# Patient Record
Sex: MALE | Race: WHITE | NOT HISPANIC OR LATINO | Employment: UNEMPLOYED | URBAN - METROPOLITAN AREA
[De-identification: names, ages, dates, MRNs, and addresses within clinical notes are randomized per-mention and may not be internally consistent; named-entity substitution may affect disease eponyms.]

---

## 2021-02-03 ENCOUNTER — OFFICE VISIT (OUTPATIENT)
Dept: FAMILY MEDICINE CLINIC | Facility: CLINIC | Age: 31
End: 2021-02-03

## 2021-02-03 VITALS
SYSTOLIC BLOOD PRESSURE: 120 MMHG | OXYGEN SATURATION: 95 % | HEIGHT: 72 IN | DIASTOLIC BLOOD PRESSURE: 82 MMHG | WEIGHT: 247.4 LBS | BODY MASS INDEX: 33.51 KG/M2 | HEART RATE: 107 BPM

## 2021-02-03 DIAGNOSIS — F41.9 ANXIETY: Primary | ICD-10-CM

## 2021-02-03 DIAGNOSIS — J30.2 SEASONAL ALLERGIC RHINITIS, UNSPECIFIED TRIGGER: ICD-10-CM

## 2021-02-03 DIAGNOSIS — J35.1 ENLARGED TONSILS: ICD-10-CM

## 2021-02-03 PROCEDURE — 99204 OFFICE O/P NEW MOD 45 MIN: CPT | Performed by: INTERNAL MEDICINE

## 2021-02-03 RX ORDER — FLUTICASONE PROPIONATE 50 MCG
2 SPRAY, SUSPENSION (ML) NASAL NIGHTLY
Qty: 17 ML | Refills: 6 | Status: SHIPPED | OUTPATIENT
Start: 2021-02-03 | End: 2022-01-04

## 2021-02-03 RX ORDER — CETIRIZINE HYDROCHLORIDE 10 MG/1
10 TABLET ORAL DAILY
Qty: 30 TABLET | Refills: 11 | Status: SHIPPED | OUTPATIENT
Start: 2021-02-03 | End: 2022-02-14

## 2021-02-03 RX ORDER — ESCITALOPRAM OXALATE 10 MG/1
10 TABLET ORAL DAILY
Qty: 30 TABLET | Refills: 5 | Status: SHIPPED | OUTPATIENT
Start: 2021-02-03 | End: 2021-08-11

## 2021-02-03 NOTE — PROGRESS NOTES
Tucker Coelho  1990  [id number]  Patient Care Team:  Robe Peña MD as PCP - General (Internal Medicine)    Tucker Coelho is a 30 y.o. male here today for follow up.     This patient is accompanied by their self who contributes to the history of their care.    Chief Complaint:    Chief Complaint   Patient presents with   • Establish Care   • Allergies     was on steriod, mucinex and it helped him alot. Wants a referral to the allergy specialist          History of Present Illness:  I have reviewed and/or updated the patient's past medical, past surgical, family, social history, problem list and allergies as appropriate.   Gentleman is here to establish complaining of nasal congestion and thick mucus.  He is in trouble for years.  He has been intermittently treated with allergy medicine.  He was seen by urgent treatment center and placed on cetirizine and Flonase as well as steroid taper.  This seemed to help however the further he got away from the steroids his symptoms return.  He has difficulty sleeping at night from this.  He does have large tonsils he occasionally snores but there is been no witnessed apnea.  He is able to be a passenger in a car without falling asleep he is able to read without falling asleep and is able to watch movies to completion.  He has not had formal allergy testing.  He denies any earaches or sore throat.  No eye itching or redness.  Is typically nasal obstruction itching and thick mucus.  No history of asthma.  Denies any rashes.    Additionally has been working with a therapist as he is feeling overwhelmed.  He occasionally will have racing thoughts which precipitate a subjective heavy feeling in his chest.  He sleeps for the most part okay.  No homicidal suicidal thoughts.  He feels anxious at least several times per week.  He is an  who is currently unemployed.  He is a 2-year-old daughter and a fiancée.  He has never been on medication for depression or  "anxiety.  He looks forward to doing things.  Ends to worry a significant amount.      Review of Systems:    Review of Systems   Constitutional: Negative.    HENT: Positive for congestion, postnasal drip, sinus pressure and sneezing. Negative for dental problem, hearing loss, swollen glands, tinnitus, trouble swallowing and voice change.         Nasal obstruction at night   Eyes: Negative.    Respiratory: Negative.    Gastrointestinal: Negative.    Endocrine: Negative.    Genitourinary: Negative.    Skin: Negative.    Neurological: Negative.    Psychiatric/Behavioral: Positive for stress. Negative for decreased concentration, self-injury, sleep disturbance, suicidal ideas and depressed mood. The patient is nervous/anxious.        Vitals:    02/03/21 1258   BP: 120/82   Pulse: 107   SpO2: 95%   Weight: 112 kg (247 lb 6.4 oz)   Height: 182.9 cm (72\")     Body mass index is 33.55 kg/m².      Current Outpatient Medications:   •  cetirizine (zyrTEC) 10 MG tablet, Take 1 tablet by mouth Daily., Disp: 30 tablet, Rfl: 11  •  fluticasone (FLONASE) 50 MCG/ACT nasal spray, 2 sprays into the nostril(s) as directed by provider Every Night for 30 days. Administer 2 sprays in each nostril for each dose., Disp: 17 mL, Rfl: 6  •  escitalopram (Lexapro) 10 MG tablet, Take 1 tablet by mouth Daily., Disp: 30 tablet, Rfl: 5    Physical Exam:    Physical Exam  Vitals signs and nursing note reviewed.   Constitutional:       General: He is not in acute distress.     Appearance: He is well-developed. He is not diaphoretic.   HENT:      Head: Normocephalic and atraumatic.      Right Ear: External ear normal.      Left Ear: External ear normal.      Mouth/Throat:      Mouth: Mucous membranes are moist.      Pharynx: Oropharynx is clear. No oropharyngeal exudate.      Comments: Nearly kissing tonsils  Eyes:      General: No scleral icterus.        Right eye: No discharge.      Conjunctiva/sclera: Conjunctivae normal.   Neck:      " Musculoskeletal: Normal range of motion and neck supple.      Thyroid: No thyromegaly.      Vascular: No JVD.      Trachea: No tracheal deviation.   Cardiovascular:      Rate and Rhythm: Normal rate and regular rhythm.      Heart sounds: Normal heart sounds.      Comments: PMI nondisplaced  Pulmonary:      Effort: Pulmonary effort is normal.      Breath sounds: Normal breath sounds. No wheezing or rales.   Abdominal:      General: Bowel sounds are normal.      Palpations: Abdomen is soft.      Tenderness: There is no abdominal tenderness. There is no guarding or rebound.   Musculoskeletal:      Comments: Normal gait   Lymphadenopathy:      Cervical: No cervical adenopathy.   Skin:     General: Skin is warm and dry.      Capillary Refill: Capillary refill takes less than 2 seconds.      Coloration: Skin is not pale.      Findings: No rash.   Neurological:      Mental Status: He is alert and oriented to person, place, and time.      Motor: No abnormal muscle tone.      Coordination: Coordination normal.   Psychiatric:         Judgment: Judgment normal.         Procedures    Results Review:    None    Assessment/Plan:  This is a 30-year-old gentleman who presents with the symptoms that are likely seasonal allergic rhinitis.  Cannot rule out that he has a nasal obstruction.  He also has significant Mik enlarged tonsils and is at risk for sleep apnea however does not appear to be symptomatic with this.  I have referred him to ear nose and throat for laryngoscopy as well as allergy testing.    Regarding his anxiety I encouraged him to continue therapy however recommended Lexapro 10 mg daily.  I would like for him to follow-up here in 4 weeks to see if his symptoms will likely continue otherwise we will taper him off of this.  He seems amenable to this plan.  Problem List Items Addressed This Visit        Allergies and Adverse Reactions    Seasonal allergic rhinitis    Relevant Medications    cetirizine (zyrTEC) 10 MG  tablet    fluticasone (FLONASE) 50 MCG/ACT nasal spray    Other Relevant Orders    Ambulatory Referral to ENT (Otolaryngology)       ENT    Enlarged tonsils       Mental Health    Anxiety - Primary    Current Assessment & Plan     Regarding his anxiety I encouraged him to continue therapy however recommended Lexapro 10 mg daily.  I would like for him to follow-up here in 4 weeks to see if his symptoms will likely continue otherwise we will taper him off of this.  He seems amenable to this plan.               Plan of care reviewed with patient at the conclusion of today's visit. Education was provided regarding diagnosis and management.  Patient verbalizes understanding of and agreement with management plan.    Return in about 4 weeks (around 3/3/2021) for Annual.    Robe Peña MD    Please note that portions of this note may have been completed with a voice recognition program. Efforts were made to edit the dictations, but occasionally words are mistranscribed.

## 2021-02-03 NOTE — ASSESSMENT & PLAN NOTE
Regarding his anxiety I encouraged him to continue therapy however recommended Lexapro 10 mg daily.  I would like for him to follow-up here in 4 weeks to see if his symptoms will likely continue otherwise we will taper him off of this.  He seems amenable to this plan.

## 2021-03-03 ENCOUNTER — OFFICE VISIT (OUTPATIENT)
Dept: FAMILY MEDICINE CLINIC | Facility: CLINIC | Age: 31
End: 2021-03-03

## 2021-03-03 VITALS
WEIGHT: 246 LBS | HEIGHT: 72 IN | SYSTOLIC BLOOD PRESSURE: 110 MMHG | OXYGEN SATURATION: 98 % | HEART RATE: 73 BPM | DIASTOLIC BLOOD PRESSURE: 80 MMHG | BODY MASS INDEX: 33.32 KG/M2

## 2021-03-03 DIAGNOSIS — K21.9 GASTROESOPHAGEAL REFLUX DISEASE, UNSPECIFIED WHETHER ESOPHAGITIS PRESENT: ICD-10-CM

## 2021-03-03 DIAGNOSIS — Z11.59 ENCOUNTER FOR HEPATITIS C SCREENING TEST FOR LOW RISK PATIENT: ICD-10-CM

## 2021-03-03 DIAGNOSIS — Z13.220 SCREENING CHOLESTEROL LEVEL: ICD-10-CM

## 2021-03-03 DIAGNOSIS — F41.9 ANXIETY: ICD-10-CM

## 2021-03-03 DIAGNOSIS — Z13.29 THYROID DISORDER SCREENING: ICD-10-CM

## 2021-03-03 DIAGNOSIS — Z00.00 ANNUAL PHYSICAL EXAM: Primary | ICD-10-CM

## 2021-03-03 DIAGNOSIS — F40.231 SEVERE NEEDLE PHOBIA: ICD-10-CM

## 2021-03-03 PROCEDURE — 99395 PREV VISIT EST AGE 18-39: CPT | Performed by: INTERNAL MEDICINE

## 2021-03-03 RX ORDER — OMEPRAZOLE 20 MG/1
20 CAPSULE, DELAYED RELEASE ORAL DAILY
Qty: 30 CAPSULE | Refills: 6 | Status: SHIPPED | OUTPATIENT
Start: 2021-03-03 | End: 2021-10-13

## 2021-03-03 NOTE — ASSESSMENT & PLAN NOTE
Given the ear nose and throat comments, has persistent congestion and a duration of reflux, I have recommended an EGD.  He is concerned given his needle phobia.  I did indicate that he is did undergo endoscopy we could provide him with a one-time dose of a benzodiazepine in efforts to well his anxiety awaiting his procedure.  He will consider this.  However we have placed him on omeprazole 20 mg daily.

## 2021-03-03 NOTE — ASSESSMENT & PLAN NOTE
I have ordered CMP CBC TSH hepatitis C antibody.  He however will likely not show up for his lab draw secondary to severe needle phobia.

## 2021-03-03 NOTE — ASSESSMENT & PLAN NOTE
General improved with Lexapro 10 mg daily.  He does have a little bit of daytime fatigue of asked him to take it at night.

## 2021-03-03 NOTE — PROGRESS NOTES
Tucker Coelho  1990  [id number]  Patient Care Team:  Robe Peña MD as PCP - General (Internal Medicine)    Tucker Coelho is a 30 y.o. male who is here today for his annual physical   This patient is accompanied by his self who contributes to the history of his care.    Chief Complaint:    Chief Complaint   Patient presents with   • Annual Exam          History of Present Illness:    Gerd, used to take ranitidine and OTC prilosec. + brashwater, awakes with lots of drainage in throat. ENT feels PRAKASH may be more severe. Denies dysphagia or odynophagia. Denies n/v . Has been on OTC medications  For years. No prior endoscopy.    Past Medical History:   Diagnosis Date   • Allergic        History reviewed. No pertinent surgical history.     Family History   Problem Relation Age of Onset   • Diabetes Maternal Grandmother    • Hypertension Maternal Grandfather    • Anxiety disorder Mother    • No Known Problems Father        Social History     Socioeconomic History   • Marital status: Single     Spouse name: Not on file   • Number of children: Not on file   • Years of education: Not on file   • Highest education level: Not on file   Tobacco Use   • Smoking status: Never Smoker   • Smokeless tobacco: Never Used   Substance and Sexual Activity   • Alcohol use: Yes     Alcohol/week: 1.0 standard drinks     Types: 1 Cans of beer per week     Comment: rarely   • Drug use: Never   • Sexual activity: Yes     Birth control/protection: OCP   Social History Narrative    In a relationship.        No Known Allergies    Depression: PHQ-2 Depression Screening  Little interest or pleasure in doing things?  0   Feeling down, depressed, or hopeless?  0   PHQ-2 Total Score  0        There is no immunization history on file for this patient.    Review of Systems:    Review of Systems   Constitutional: Negative.    HENT: Positive for postnasal drip.         Snoring, nasal congestion   Eyes: Negative.    Respiratory:  "Negative.    Cardiovascular: Negative.    Gastrointestinal: Negative.    Endocrine: Negative.    Genitourinary: Negative.    Musculoskeletal: Negative.    Skin: Negative.    Allergic/Immunologic: Negative for environmental allergies and food allergies.   Neurological: Negative.    Hematological: Negative.    Psychiatric/Behavioral: Negative.        Vitals:    03/03/21 0946   BP: 110/80   Pulse: 73   SpO2: 98%   Weight: 112 kg (246 lb)   Height: 182.9 cm (72\")     Body mass index is 33.36 kg/m².      Current Outpatient Medications:   •  cetirizine (zyrTEC) 10 MG tablet, Take 1 tablet by mouth Daily., Disp: 30 tablet, Rfl: 11  •  escitalopram (Lexapro) 10 MG tablet, Take 1 tablet by mouth Daily., Disp: 30 tablet, Rfl: 5  •  fluticasone (FLONASE) 50 MCG/ACT nasal spray, 2 sprays into the nostril(s) as directed by provider Every Night for 30 days. Administer 2 sprays in each nostril for each dose., Disp: 17 mL, Rfl: 6  •  omeprazole (PrilOSEC) 20 MG capsule, Take 1 capsule by mouth Daily., Disp: 30 capsule, Rfl: 6    Physical Exam:    Physical Exam  Vitals signs and nursing note reviewed.   Constitutional:       General: He is not in acute distress.     Appearance: Normal appearance. He is well-developed. He is not diaphoretic.   HENT:      Head: Normocephalic and atraumatic.      Right Ear: External ear normal.      Left Ear: External ear normal.      Nose: Congestion present.      Mouth/Throat:      Mouth: Mucous membranes are moist.      Pharynx: No oropharyngeal exudate.      Comments: Tonsillar hypertrophy  Eyes:      General: No scleral icterus.        Right eye: No discharge.         Left eye: No discharge.      Extraocular Movements: Extraocular movements intact.      Conjunctiva/sclera: Conjunctivae normal.      Pupils: Pupils are equal, round, and reactive to light.   Neck:      Musculoskeletal: Normal range of motion and neck supple.      Thyroid: No thyromegaly.      Vascular: No JVD.      Trachea: No " tracheal deviation.   Cardiovascular:      Rate and Rhythm: Normal rate and regular rhythm.      Pulses: Normal pulses.      Heart sounds: Normal heart sounds. No murmur. No gallop.       Comments: PMI nondisplaced  Pulmonary:      Effort: Pulmonary effort is normal.      Breath sounds: Normal breath sounds. No wheezing or rales.   Abdominal:      General: Bowel sounds are normal. There is no distension.      Palpations: Abdomen is soft. There is no mass.      Tenderness: There is no abdominal tenderness. There is no guarding or rebound.      Hernia: No hernia is present.   Musculoskeletal: Normal range of motion.      Comments: Normal gait   Lymphadenopathy:      Cervical: No cervical adenopathy.   Skin:     General: Skin is warm and dry.      Capillary Refill: Capillary refill takes less than 2 seconds.      Coloration: Skin is not jaundiced or pale.      Findings: No bruising or rash.   Neurological:      Mental Status: He is alert and oriented to person, place, and time.      Motor: No abnormal muscle tone.      Coordination: Coordination normal.   Psychiatric:         Mood and Affect: Mood normal.         Behavior: Behavior normal.         Thought Content: Thought content normal.         Judgment: Judgment normal.         Procedures    Results Review:    None    Assessment/Plan:     Problem List Items Addressed This Visit        Gastrointestinal Abdominal     GERD (gastroesophageal reflux disease)    Current Assessment & Plan     Given the ear nose and throat comments, has persistent congestion and a duration of reflux, I have recommended an EGD.  He is concerned given his needle phobia.  I did indicate that he is did undergo endoscopy we could provide him with a one-time dose of a benzodiazepine in efforts to well his anxiety awaiting his procedure.  He will consider this.  However we have placed him on omeprazole 20 mg daily.         Relevant Medications    omeprazole (PrilOSEC) 20 MG capsule       Mental  Health    Anxiety    Current Assessment & Plan     General improved with Lexapro 10 mg daily.  He does have a little bit of daytime fatigue of asked him to take it at night.         Severe needle phobia    Current Assessment & Plan     I have ordered CMP CBC TSH hepatitis C antibody.  He however will likely not show up for his lab draw secondary to severe needle phobia.           Other Visit Diagnoses     Annual physical exam    -  Primary    Relevant Orders    CBC & Differential    Comprehensive Metabolic Panel    Encounter for hepatitis C screening test for low risk patient        Relevant Orders    Hepatitis C antibody    Thyroid disorder screening        Relevant Orders    TSH Rfx On Abnormal To Free T4    Screening cholesterol level        Relevant Orders    Lipid Panel          Plan of care was reviewed with patient at the conclusion of today's visit. Counseled patient with regards to good nutrition and diet. Maintaining a healthy lifestyle including exercise and physical activities. Spoke with patient on ways to reduce stress, getting adequate sleep and injury prevention.  Discussed prostate cancer screening, colon cancer screening including benefit of early detection and potential need for follow-up. Patient agrees to screenings today. Annual dental and eye exams were encouraged. Encouraged patient to continue to follow up with annual immunizations.     Return in about 6 months (around 9/3/2021), or omeprazole/lexapro.    Robe Peña MD    Please note that portions of this note may have been completed with a voice recognition program. Efforts were made to edit the dictations, but occasionally words are mistranscribed.

## 2021-08-11 RX ORDER — ESCITALOPRAM OXALATE 10 MG/1
TABLET ORAL
Qty: 30 TABLET | Refills: 5 | Status: SHIPPED | OUTPATIENT
Start: 2021-08-11 | End: 2022-02-15

## 2021-08-11 NOTE — TELEPHONE ENCOUNTER
Rx Refill Note  Requested Prescriptions     Pending Prescriptions Disp Refills   • escitalopram (LEXAPRO) 10 MG tablet [Pharmacy Med Name: ESCITALOPRAM 10 MG TABLET] 30 tablet 5     Sig: TAKE 1 TABLET BY MOUTH EVERY DAY      Last office visit with prescribing clinician: 3/3/2021      Next office visit with prescribing clinician: 8/12/2021   }  Cristina Nunez MA  08/11/21, 13:07 EDT     Last fill: 02/03/2021

## 2021-08-12 ENCOUNTER — OFFICE VISIT (OUTPATIENT)
Dept: FAMILY MEDICINE CLINIC | Facility: CLINIC | Age: 31
End: 2021-08-12

## 2021-08-12 VITALS
DIASTOLIC BLOOD PRESSURE: 62 MMHG | HEIGHT: 72 IN | WEIGHT: 244 LBS | HEART RATE: 72 BPM | SYSTOLIC BLOOD PRESSURE: 112 MMHG | OXYGEN SATURATION: 98 % | BODY MASS INDEX: 33.05 KG/M2

## 2021-08-12 DIAGNOSIS — F41.9 ANXIETY: ICD-10-CM

## 2021-08-12 DIAGNOSIS — J30.2 SEASONAL ALLERGIC RHINITIS, UNSPECIFIED TRIGGER: ICD-10-CM

## 2021-08-12 DIAGNOSIS — K21.9 GASTROESOPHAGEAL REFLUX DISEASE, UNSPECIFIED WHETHER ESOPHAGITIS PRESENT: Primary | ICD-10-CM

## 2021-08-12 PROCEDURE — 99213 OFFICE O/P EST LOW 20 MIN: CPT | Performed by: INTERNAL MEDICINE

## 2021-08-12 NOTE — ASSESSMENT & PLAN NOTE
Well-controlled with Lexapro.  He is experiencing no side effects.  We will reevaluate this in 6 months.  Refills were sent in yesterday.

## 2021-08-12 NOTE — PROGRESS NOTES
"Tucker Coelho  1990  [id number]  Patient Care Team:  Robe Peña MD as PCP - General (Internal Medicine)    Tucker Coelho is a 31 y.o. male here today for follow up.     This patient is accompanied by their self who contributes to the history of their care.    Chief Complaint:    Chief Complaint   Patient presents with   • Anxiety     Follow up Doing a lot better.         History of Present Illness:  I have reviewed and/or updated the patient's past medical, past surgical, family, social history, problem list and allergies as appropriate.      Doing well with regards to anxiety and reflux. For the most part sleeping is ok. Dreams vivid with talking in his sleep.. Less agitation. Allergies remain in check as long as he stays on zyrtec/flonase. Denies dysphagia, n/v. Reflux sx revolved with omeprazole      Review of Systems   Constitutional: Negative.    HENT: Negative.    Respiratory: Negative.    Cardiovascular: Negative.    Gastrointestinal: Negative for GERD.   Psychiatric/Behavioral: Negative.        Vitals:    08/12/21 0814   BP: 112/62   Pulse: 72   SpO2: 98%   Weight: 111 kg (244 lb)   Height: 182.9 cm (72.01\")   PainSc: 0-No pain     Body mass index is 33.08 kg/m².    Physical Exam  Vitals and nursing note reviewed.   Constitutional:       General: He is not in acute distress.     Appearance: He is well-developed. He is not diaphoretic.   HENT:      Head: Normocephalic and atraumatic.      Right Ear: External ear normal.      Left Ear: External ear normal.      Mouth/Throat:      Mouth: Mucous membranes are moist.      Pharynx: Oropharynx is clear. No oropharyngeal exudate.   Eyes:      General: No scleral icterus.        Right eye: No discharge.      Conjunctiva/sclera: Conjunctivae normal.   Neck:      Thyroid: No thyromegaly.      Vascular: No JVD.      Trachea: No tracheal deviation.   Cardiovascular:      Rate and Rhythm: Normal rate and regular rhythm.      Heart sounds: Normal heart " sounds.      Comments: PMI nondisplaced  Pulmonary:      Effort: Pulmonary effort is normal. No respiratory distress.      Breath sounds: Normal breath sounds. No wheezing or rales.   Abdominal:      General: Bowel sounds are normal.      Palpations: Abdomen is soft.      Tenderness: There is no abdominal tenderness. There is no guarding or rebound.   Musculoskeletal:      Cervical back: Normal range of motion and neck supple.      Comments: Normal gait   Lymphadenopathy:      Cervical: No cervical adenopathy.   Skin:     General: Skin is warm and dry.      Capillary Refill: Capillary refill takes less than 2 seconds.      Coloration: Skin is not pale.      Findings: No rash.   Neurological:      Mental Status: He is alert and oriented to person, place, and time.      Motor: No abnormal muscle tone.      Coordination: Coordination normal.   Psychiatric:         Judgment: Judgment normal.         Procedures    Results Review:    I reviewed the patient's new clinical results.    Assessment/Plan:     Problem List Items Addressed This Visit        Allergies and Adverse Reactions    Seasonal allergic rhinitis    Current Assessment & Plan     controlled with Zyrtec and Flonase.         Relevant Medications    cetirizine (zyrTEC) 10 MG tablet       Gastrointestinal Abdominal     GERD (gastroesophageal reflux disease) - Primary    Current Assessment & Plan     Stable on Prilosec.  Reminded not to eat 2 hours before supine.  Elevate head of bed.         Relevant Medications    omeprazole (PrilOSEC) 20 MG capsule       Mental Health    Anxiety    Current Assessment & Plan     Well-controlled with Lexapro.  He is experiencing no side effects.  We will reevaluate this in 6 months.  Refills were sent in yesterday.               Plan of care reviewed with patient at the conclusion of today's visit. Education was provided regarding diagnosis and management.  Patient verbalizes understanding of and agreement with management  plan.    Return in about 6 months (around 2/12/2022) for Annual.    Robe Peña MD    Please note that portions of this note may have been completed with a voice recognition program. Efforts were made to edit the dictations, but occasionally words are mistranscribed.

## 2021-10-13 RX ORDER — OMEPRAZOLE 20 MG/1
CAPSULE, DELAYED RELEASE ORAL
Qty: 30 CAPSULE | Refills: 4 | Status: SHIPPED | OUTPATIENT
Start: 2021-10-13 | End: 2022-03-14

## 2021-10-13 NOTE — TELEPHONE ENCOUNTER
Rx Refill Note  Requested Prescriptions     Pending Prescriptions Disp Refills   • omeprazole (priLOSEC) 20 MG capsule [Pharmacy Med Name: OMEPRAZOLE DR 20 MG CAPSULE] 30 capsule 4     Sig: TAKE 1 CAPSULE BY MOUTH EVERY DAY      Last office visit with prescribing clinician: 8/12/2021      Next office visit with prescribing clinician: 3/4/2022            Ana Lilia Lino MA  10/13/21, 08:33 EDT

## 2021-10-22 ENCOUNTER — OFFICE VISIT (OUTPATIENT)
Dept: FAMILY MEDICINE CLINIC | Facility: CLINIC | Age: 31
End: 2021-10-22

## 2021-10-22 VITALS
BODY MASS INDEX: 33.81 KG/M2 | DIASTOLIC BLOOD PRESSURE: 88 MMHG | HEART RATE: 84 BPM | SYSTOLIC BLOOD PRESSURE: 126 MMHG | HEIGHT: 72 IN | WEIGHT: 249.6 LBS | OXYGEN SATURATION: 98 %

## 2021-10-22 DIAGNOSIS — G89.29 CHRONIC LEFT SHOULDER PAIN: Primary | ICD-10-CM

## 2021-10-22 DIAGNOSIS — M25.512 CHRONIC LEFT SHOULDER PAIN: Primary | ICD-10-CM

## 2021-10-22 DIAGNOSIS — R21 RASH: ICD-10-CM

## 2021-10-22 DIAGNOSIS — M54.2 NECK PAIN: ICD-10-CM

## 2021-10-22 PROBLEM — J30.2 SEASONAL ALLERGIES: Status: ACTIVE | Noted: 2019-08-27

## 2021-10-22 PROCEDURE — 99213 OFFICE O/P EST LOW 20 MIN: CPT | Performed by: INTERNAL MEDICINE

## 2021-10-22 RX ORDER — CYCLOBENZAPRINE HCL 10 MG
10 TABLET ORAL 3 TIMES DAILY PRN
COMMUNITY

## 2021-10-22 RX ORDER — BACLOFEN 10 MG/1
10 TABLET ORAL 3 TIMES DAILY
Qty: 90 TABLET | Refills: 2 | Status: SHIPPED | OUTPATIENT
Start: 2021-10-22 | End: 2022-02-15

## 2021-10-22 RX ORDER — FLUTICASONE PROPIONATE 50 MCG
2 SPRAY, SUSPENSION (ML) NASAL DAILY
COMMUNITY
End: 2022-09-29

## 2021-10-22 RX ORDER — MELOXICAM 15 MG/1
15 TABLET ORAL DAILY
Qty: 30 TABLET | Refills: 2 | Status: SHIPPED | OUTPATIENT
Start: 2021-10-22 | End: 2022-02-15
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
## 2021-11-06 ENCOUNTER — HOSPITAL ENCOUNTER (OUTPATIENT)
Dept: MRI IMAGING | Facility: HOSPITAL | Age: 31
Discharge: HOME | End: 2021-11-06
Admitting: INTERNAL MEDICINE

## 2021-11-06 DIAGNOSIS — M25.512 CHRONIC LEFT SHOULDER PAIN: ICD-10-CM

## 2021-11-06 DIAGNOSIS — M54.2 NECK PAIN: ICD-10-CM

## 2021-11-06 DIAGNOSIS — G89.29 CHRONIC LEFT SHOULDER PAIN: ICD-10-CM

## 2021-11-06 PROCEDURE — 72141 MRI NECK SPINE W/O DYE: CPT

## 2021-11-08 ENCOUNTER — TELEPHONE (OUTPATIENT)
Dept: FAMILY MEDICINE CLINIC | Facility: CLINIC | Age: 31
End: 2021-11-08

## 2021-11-08 NOTE — TELEPHONE ENCOUNTER
----- Message from Robe Peña MD sent at 11/8/2021  1:31 PM EST -----  Nothing severe on his MRI to explain his left upper extremity pain and weakness.  He does have some arthritic changes however this is on the right side.  I would suggest to continue with physical therapy

## 2021-11-08 NOTE — TELEPHONE ENCOUNTER
"Attempted to contact, no answer. Unable to LVM    \"Nothing severe on his MRI to explain his left upper extremity pain and weakness.  He does have some arthritic changes however this is on the right side.  I would suggest to continue with physical therapy\"  Hub can relay and document.     "

## 2021-11-08 NOTE — TELEPHONE ENCOUNTER
"Third attempt to contact, no answer LM relaying results.     \"\"Nothing severe on his MRI to explain his left upper extremity pain and weakness.  He does have some arthritic changes however this is on the right side.  I would suggest to continue with physical therapy\"  Hub can relay and document.   "

## 2021-11-18 ENCOUNTER — OFFICE VISIT (OUTPATIENT)
Dept: ORTHOPEDIC SURGERY | Facility: CLINIC | Age: 31
End: 2021-11-18

## 2021-11-18 VITALS
WEIGHT: 249.12 LBS | SYSTOLIC BLOOD PRESSURE: 142 MMHG | HEIGHT: 71 IN | BODY MASS INDEX: 34.88 KG/M2 | DIASTOLIC BLOOD PRESSURE: 80 MMHG | HEART RATE: 63 BPM

## 2021-11-18 DIAGNOSIS — S46.812A TRAPEZIUS MUSCLE STRAIN, LEFT, INITIAL ENCOUNTER: ICD-10-CM

## 2021-11-18 DIAGNOSIS — M25.512 LEFT SHOULDER PAIN, UNSPECIFIED CHRONICITY: ICD-10-CM

## 2021-11-18 DIAGNOSIS — M75.42 IMPINGEMENT SYNDROME OF LEFT SHOULDER: ICD-10-CM

## 2021-11-18 DIAGNOSIS — G25.89 SCAPULAR DYSKINESIS: Primary | ICD-10-CM

## 2021-11-18 PROCEDURE — 99204 OFFICE O/P NEW MOD 45 MIN: CPT | Performed by: ORTHOPAEDIC SURGERY
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
## 2022-01-04 DIAGNOSIS — J30.2 SEASONAL ALLERGIC RHINITIS, UNSPECIFIED TRIGGER: ICD-10-CM

## 2022-01-04 RX ORDER — FLUTICASONE PROPIONATE 50 MCG
SPRAY, SUSPENSION (ML) NASAL
Qty: 16 ML | Refills: 6 | Status: SHIPPED | OUTPATIENT
Start: 2022-01-04 | End: 2022-04-28

## 2022-01-04 NOTE — TELEPHONE ENCOUNTER
Rx Refill Note  Requested Prescriptions     Pending Prescriptions Disp Refills   • fluticasone (FLONASE) 50 MCG/ACT nasal spray [Pharmacy Med Name: FLUTICASONE PROP 50 MCG SPRAY] 16 mL 6     Sig: ADMINISTER 2 SPRAYS IN EACH NOSTRIL FOR EACH DOSE AS DIRECTED EVERY NIGHT      Last office visit with prescribing clinician: 10/22/2021      Next office visit with prescribing clinician: 3/4/2022            Ana Lilia Lino MA  01/04/22, 09:42 EST

## 2022-02-02 ENCOUNTER — TELEPHONE (OUTPATIENT)
Dept: ORTHOPEDIC SURGERY | Facility: CLINIC | Age: 32
End: 2022-02-02

## 2022-02-13 DIAGNOSIS — J30.2 SEASONAL ALLERGIC RHINITIS, UNSPECIFIED TRIGGER: ICD-10-CM

## 2022-02-14 RX ORDER — CETIRIZINE HYDROCHLORIDE 10 MG/1
TABLET ORAL
Qty: 30 TABLET | Refills: 11 | Status: SHIPPED | OUTPATIENT
Start: 2022-02-14

## 2022-02-15 RX ORDER — MELOXICAM 15 MG/1
TABLET ORAL
Qty: 30 TABLET | Refills: 2 | Status: SHIPPED | OUTPATIENT
Start: 2022-02-15

## 2022-02-15 RX ORDER — ESCITALOPRAM OXALATE 10 MG/1
TABLET ORAL
Qty: 30 TABLET | Refills: 5 | Status: SHIPPED | OUTPATIENT
Start: 2022-02-15 | End: 2022-08-13 | Stop reason: SDUPTHER

## 2022-02-15 RX ORDER — BACLOFEN 10 MG/1
TABLET ORAL
Qty: 90 TABLET | Refills: 2 | Status: SHIPPED | OUTPATIENT
Start: 2022-02-15

## 2022-03-14 RX ORDER — OMEPRAZOLE 20 MG/1
CAPSULE, DELAYED RELEASE ORAL
Qty: 30 CAPSULE | Refills: 4 | Status: SHIPPED | OUTPATIENT
Start: 2022-03-14 | End: 2022-08-24

## 2022-03-14 NOTE — TELEPHONE ENCOUNTER
Rx Refill Note  Requested Prescriptions     Pending Prescriptions Disp Refills   • omeprazole (priLOSEC) 20 MG capsule [Pharmacy Med Name: OMEPRAZOLE DR 20 MG CAPSULE] 30 capsule 4     Sig: TAKE 1 CAPSULE BY MOUTH EVERY DAY      Last office visit with prescribing clinician: 10/22/2021      Next office visit with prescribing clinician: Visit date not found            LIZA COLVIN MA  03/14/22, 08:26 EDT     Proton Pump Inhibitors Protocol Failed     Patient is due for annual exam and a 6 week f/u. Tried leaving voicemail, but unable due to voicemail being full.     Hub may relay message if patient calls back.

## 2022-04-28 ENCOUNTER — OFFICE VISIT (OUTPATIENT)
Dept: ORTHOPEDIC SURGERY | Facility: CLINIC | Age: 32
End: 2022-04-28

## 2022-04-28 VITALS
WEIGHT: 249.12 LBS | HEIGHT: 71 IN | SYSTOLIC BLOOD PRESSURE: 114 MMHG | BODY MASS INDEX: 34.88 KG/M2 | DIASTOLIC BLOOD PRESSURE: 84 MMHG

## 2022-04-28 DIAGNOSIS — S46.812A TRAPEZIUS MUSCLE STRAIN, LEFT, INITIAL ENCOUNTER: ICD-10-CM

## 2022-04-28 DIAGNOSIS — M25.512 LEFT SHOULDER PAIN, UNSPECIFIED CHRONICITY: ICD-10-CM

## 2022-04-28 DIAGNOSIS — G25.89 SCAPULAR DYSKINESIS: Primary | ICD-10-CM

## 2022-04-28 DIAGNOSIS — M75.42 IMPINGEMENT SYNDROME OF LEFT SHOULDER: ICD-10-CM

## 2022-04-28 PROCEDURE — 99213 OFFICE O/P EST LOW 20 MIN: CPT | Performed by: ORTHOPAEDIC SURGERY
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
## 2022-08-11 RX ORDER — ESCITALOPRAM OXALATE 10 MG/1
TABLET ORAL
Qty: 30 TABLET | Refills: 2 | OUTPATIENT
Start: 2022-08-11

## 2022-08-11 NOTE — TELEPHONE ENCOUNTER
Rx Refill Note  Requested Prescriptions     Pending Prescriptions Disp Refills   • escitalopram (LEXAPRO) 10 MG tablet [Pharmacy Med Name: ESCITALOPRAM 10 MG TABLET] 30 tablet 2     Sig: TAKE 1 TABLET BY MOUTH EVERY DAY      Last office visit with prescribing clinician: 10/22/2021      Next office visit with prescribing clinician: Visit date not found 3}  Cristina Nunez MA  08/11/22, 09:53 EDT     Last fill: 02/15/2022    Per LOV note pt was to return in about 6 weeks around 12/3/2021

## 2022-08-11 NOTE — TELEPHONE ENCOUNTER
Contacted pt & informed him that he is due for f/u/annaual. Pt verbalized understanding and is now scheduled for a visit on 8/19/2022. Pt states he thinks he has enough medication to last until his appt but is not sure until he gets home. He states he will go home & check and if not he will send a [employer] message/call to request a short supply. Pt did not have any additional questions at this time.

## 2022-08-13 RX ORDER — ESCITALOPRAM OXALATE 10 MG/1
10 TABLET ORAL DAILY
Qty: 7 TABLET | Refills: 0 | Status: SHIPPED | OUTPATIENT
Start: 2022-08-13 | End: 2022-08-29 | Stop reason: SDUPTHER

## 2022-08-24 RX ORDER — OMEPRAZOLE 20 MG/1
CAPSULE, DELAYED RELEASE ORAL
Qty: 30 CAPSULE | Refills: 2 | Status: SHIPPED | OUTPATIENT
Start: 2022-08-24 | End: 2022-11-28

## 2022-08-24 NOTE — TELEPHONE ENCOUNTER
Rx Refill Note  Requested Prescriptions     Pending Prescriptions Disp Refills   • omeprazole (priLOSEC) 20 MG capsule [Pharmacy Med Name: OMEPRAZOLE DR 20 MG CAPSULE] 30 capsule 2     Sig: TAKE 1 CAPSULE BY MOUTH EVERY DAY      Last office visit with prescribing clinician: 10/22/2021      Next office visit with prescribing clinician: 9/19/2022            Kierra Hughes MA  08/24/22, 13:52 EDT

## 2022-08-29 RX ORDER — ESCITALOPRAM OXALATE 10 MG/1
10 TABLET ORAL DAILY
Qty: 30 TABLET | Refills: 0 | Status: SHIPPED | OUTPATIENT
Start: 2022-08-29 | End: 2022-09-22

## 2022-08-29 NOTE — TELEPHONE ENCOUNTER
Rx Refill Note  Requested Prescriptions     Signed Prescriptions Disp Refills   • escitalopram (LEXAPRO) 10 MG tablet 30 tablet 0     Sig: Take 1 tablet by mouth Daily.     Authorizing Provider: LAYTON CHATMAN     Ordering User: MARLENI AMIN      Last office visit with prescribing clinician: 10/22/2021      Next office visit with prescribing clinician: 9/19/2022            Marleni Amin MA  08/29/22, 11:31 EDT

## 2022-09-20 RX ORDER — KETOCONAZOLE 20 MG/ML
SHAMPOO TOPICAL
COMMUNITY
Start: 2022-08-08

## 2022-09-20 RX ORDER — PERMETHRIN 50 MG/G
CREAM TOPICAL
COMMUNITY
Start: 2022-07-04 | End: 2022-09-22

## 2022-09-20 RX ORDER — CEPHALEXIN 500 MG/1
CAPSULE ORAL
COMMUNITY
Start: 2022-07-03 | End: 2022-09-22

## 2022-09-22 ENCOUNTER — TELEMEDICINE (OUTPATIENT)
Dept: FAMILY MEDICINE CLINIC | Facility: CLINIC | Age: 32
End: 2022-09-22

## 2022-09-22 DIAGNOSIS — J30.2 SEASONAL ALLERGIC RHINITIS, UNSPECIFIED TRIGGER: ICD-10-CM

## 2022-09-22 DIAGNOSIS — F41.9 ANXIETY: Primary | ICD-10-CM

## 2022-09-22 PROCEDURE — 99213 OFFICE O/P EST LOW 20 MIN: CPT | Performed by: INTERNAL MEDICINE

## 2022-09-22 RX ORDER — LEVOCETIRIZINE DIHYDROCHLORIDE 5 MG/1
5 TABLET, FILM COATED ORAL EVERY EVENING
Qty: 90 TABLET | Refills: 2 | Status: SHIPPED | OUTPATIENT
Start: 2022-09-22

## 2022-09-22 RX ORDER — ESCITALOPRAM OXALATE 20 MG/1
20 TABLET ORAL DAILY
Qty: 90 TABLET | Refills: 2 | Status: SHIPPED | OUTPATIENT
Start: 2022-09-22

## 2022-09-22 NOTE — ASSESSMENT & PLAN NOTE
Continue Flonase.  Possibly experiencing some tachyphylaxis.  No weight change in the Xyzal which is on his formulary.  Discontinue Zyrtec.

## 2022-09-22 NOTE — PROGRESS NOTES
Tucker Coelho  1990  [id number]  Patient Care Team:  Robe Peña MD as PCP - General (Internal Medicine)    Tucker Coelho is a 32 y.o. male here today for follow up.     This patient is accompanied by their self who contributes to the history of their care.    Chief Complaint:    No chief complaint on file.  Follow-up Lexapro and allergies.    Video visit was requested by patient.  He is located in [facility].  I myself am located in [facility].    History of Present Illness:  I have reviewed and/or updated the patient's past medical, past surgical, family, social history, problem list and allergies as appropriate.     He has been maintained on 10 mg of Lexapro with good improvement.  Unfortunately he had ran out of his medications.  After about 2 days he noticed increased irritability.  Overall he feels it is helped his mood.  No HI or SI.  He would like to continue this.  He actually would like to go on a higher dose.  He is having some mild sexual side effects however they are tolerable.    He has been on Zyrtec for about a year as well as Flonase.  Flonase continues to work however he is wondering about changing antihistamines.  He is having recurrent breakthrough allergy symptoms of nasal stuffiness itchiness.  No fevers no chills.    Review of Systems   Constitutional: Negative.    HENT: Positive for postnasal drip, rhinorrhea and sneezing.    Eyes: Negative.    Respiratory: Negative.    Cardiovascular: Negative.    Psychiatric/Behavioral: Positive for agitation. Negative for sleep disturbance and suicidal ideas. The patient is nervous/anxious.        There were no vitals filed for this visit.  There is no height or weight on file to calculate BMI.    Physical Exam  Vitals and nursing note reviewed.   Constitutional:       General: He is not in acute distress.     Appearance: He is well-developed. He is not diaphoretic.   HENT:      Head: Normocephalic and atraumatic.       Right Ear: External ear normal.      Left Ear: External ear normal.      Mouth/Throat:      Pharynx: No oropharyngeal exudate.   Eyes:      General: No scleral icterus.        Right eye: No discharge.      Conjunctiva/sclera: Conjunctivae normal.   Neck:      Thyroid: No thyromegaly.      Vascular: No JVD.      Trachea: No tracheal deviation.   Cardiovascular:      Heart sounds: Normal heart sounds.      Comments: PMI nondisplaced  Pulmonary:      Effort: Pulmonary effort is normal. No respiratory distress.   Abdominal:      Tenderness: There is no guarding.   Musculoskeletal:      Comments: Normal gait   Skin:     Capillary Refill: Capillary refill takes less than 2 seconds.      Coloration: Skin is not pale.      Findings: No rash.   Neurological:      Mental Status: He is alert and oriented to person, place, and time.      Motor: No abnormal muscle tone.      Coordination: Coordination normal.   Psychiatric:         Mood and Affect: Mood normal.         Behavior: Behavior normal.         Judgment: Judgment normal.         Procedures    Results Review:    I reviewed the patient's new clinical results.    Assessment/Plan:     Problem List Items Addressed This Visit        Allergies and Adverse Reactions    Seasonal allergic rhinitis    Current Assessment & Plan     Continue Flonase.  Possibly experiencing some tachyphylaxis.  No weight change in the Xyzal which is on his formulary.  Discontinue Zyrtec.         Relevant Medications    meloxicam (MOBIC) 15 MG tablet    cetirizine (zyrTEC) 10 MG tablet       Mental Health    Anxiety - Primary    Current Assessment & Plan     We have elected to increase his Lexapro to 20 mg daily.  Prescription has been sent into his pharmacy.  Educated him on the need to notify us early when he is getting low on his prescription to prevent rebound symptoms.               Plan of care reviewed with patient at the conclusion of today's visit. Education was provided regarding diagnosis  and management.  Patient verbalizes understanding of and agreement with management plan.    No follow-ups on file.    Robe Peña MD      Please note than portions of this note were completed wt a Voice Recognition Program

## 2022-09-22 NOTE — ASSESSMENT & PLAN NOTE
We have elected to increase his Lexapro to 20 mg daily.  Prescription has been sent into his pharmacy.  Educated him on the need to notify us early when he is getting low on his prescription to prevent rebound symptoms.

## 2022-09-29 RX ORDER — FLUTICASONE PROPIONATE 50 MCG
SPRAY, SUSPENSION (ML) NASAL
Qty: 16 ML | Refills: 2 | Status: SHIPPED | OUTPATIENT
Start: 2022-09-29

## 2022-09-29 NOTE — TELEPHONE ENCOUNTER
Rx Refill Note  Requested Prescriptions     Pending Prescriptions Disp Refills   • fluticasone (FLONASE) 50 MCG/ACT nasal spray [Pharmacy Med Name: FLUTICASONE PROP 50 MCG SPRAY] 16 mL 2     Sig: ADMINISTER 2 SPRAYS IN EACH NOSTRIL FOR EACH DOSE AS DIRECTED EVERY NIGHT      Last office visit with prescribing clinician: 9-22-22      Next office visit with prescribing clinician: Visit date not found            Jessica Enriquez MA  09/29/22, 08:43 EDT

## 2022-11-28 RX ORDER — OMEPRAZOLE 20 MG/1
CAPSULE, DELAYED RELEASE ORAL
Qty: 90 CAPSULE | Refills: 1 | Status: SHIPPED | OUTPATIENT
Start: 2022-11-28

## 2022-11-28 NOTE — TELEPHONE ENCOUNTER
Rx Refill Note  Requested Prescriptions     Pending Prescriptions Disp Refills   • omeprazole (priLOSEC) 20 MG capsule [Pharmacy Med Name: OMEPRAZOLE DR 20 MG CAPSULE] 30 capsule 2     Sig: TAKE 1 CAPSULE BY MOUTH EVERY DAY      Last office visit with prescribing clinician: 9/22/22     Next office visit with prescribing clinician: Visit date not found            Ana Lilia Lino MA  11/28/22, 13:46 EST

## 2023-05-26 RX ORDER — OMEPRAZOLE 20 MG/1
CAPSULE, DELAYED RELEASE ORAL
Qty: 90 CAPSULE | Refills: 1 | OUTPATIENT
Start: 2023-05-26